# Patient Record
Sex: MALE | Race: OTHER | URBAN - METROPOLITAN AREA
[De-identification: names, ages, dates, MRNs, and addresses within clinical notes are randomized per-mention and may not be internally consistent; named-entity substitution may affect disease eponyms.]

---

## 2017-03-23 NOTE — PATIENT DISCUSSION
Cataract :Surgery Counseling: I have discussed the option of scheduling cataract surgery versus following, as well as the risks, benefits and alternatives of surgery with the patient. It was explained that the surgery is elective, there is no rush and there is no harm in waiting to have surgery. It was also explained that there is no guarantee that removing the cataract will improve their vision. The patient understands and desires to proceed with cataract surgery with implantation of an intraocular lens  to improve vision.

## 2017-03-23 NOTE — PATIENT DISCUSSION
CATARACTS, OU - VISUALLY SIGNIFICANT.  SCHEDULE OD FIRST THEN LATER IN OS IF VISUAL SYMPTOMS PERSIST.  ((TRYPAN BLUE))_)

## 2017-04-18 NOTE — PATIENT DISCUSSION
New Prescription: Pred Forte (prednisolone acetate): drops,suspension: 1% 1 drop four times a day as directed into affected eye 04-

## 2017-04-18 NOTE — PATIENT DISCUSSION
New Prescription: Ocuflox (ofloxacin): drops: 0.3% 1 drop four times a day as directed into affected eye 04-

## 2017-04-18 NOTE — PATIENT DISCUSSION
New Prescription: Acular (ketorolac): drops: 0.5% 1 drop four times a day as directed into affected eye 04-

## 2017-04-18 NOTE — PATIENT DISCUSSION
CATARACTS, OU - VISUALLY SIGNIFICANT. SCHEDULE OD FIRST THEN LATER IN OS IF VISUAL SYMPTOMS PERSIST.

## 2017-05-30 NOTE — PATIENT DISCUSSION
New Prescription: Acular LS (ketorolac): drops: 0.4% 1 drop four times a day as directed into affected eye 05-

## 2017-06-22 NOTE — PATIENT DISCUSSION
Continue: Ocuflox (ofloxacin): drops: 0.3% 1 drop four times a day as directed into affected eye 05-

## 2017-06-22 NOTE — PATIENT DISCUSSION
Continue: Pred Forte (prednisolone acetate): drops,suspension: 1% 1 drop four times a day as directed into affected eye 06-

## 2017-06-22 NOTE — PATIENT DISCUSSION
Continue: Acular LS (ketorolac): drops: 0.4% 1 drop four times a day as directed into affected eye 05-

## 2017-07-05 NOTE — PATIENT DISCUSSION
2 WEEK POST-OP- All looks good. Stop Acular and Ocuflox. Start to taper off of Pred-forte as directed. Rx was given for glasses and/or readers recommended.

## 2017-12-19 NOTE — PATIENT DISCUSSION
Post-laser (PRP) Counseling:  Treatment of the retina with laser greatly reduces but does not completely eliminate the risk of vitreous hemorrhage, retinal detachment, or vision loss. Keep any scheduled appointments for re-evaluation of the retina, and report any increase in flashing lights or floaters.

## 2018-03-13 NOTE — PATIENT DISCUSSION
NONPROLIFERATIVE DIABETIC RETINOPATHY, OS:  STABLE.  RETURN FOR FOLLOW-UP AS SCHEDULED FOR DILATED EYE EXAM.

## 2020-12-16 NOTE — PATIENT DISCUSSION
PROLIFERATIVE DIABETIC RETINOPATHY, OD:- ADVISED PT IMPORTANCE TO KEEP BS UNDER CONTROL, ANY CHANGES IN [place] CALL FOR AN APPT

## 2020-12-16 NOTE — PATIENT DISCUSSION
NONPROLIFERATIVE DIABETIC RETINOPATHY, OS:- CONTINUE TO FOLLOW, KEEP BS UNDER CONTROL, ANY CHANGES CALL OFFICE FOR AN APPT

## 2020-12-16 NOTE — PATIENT DISCUSSION
PROLIFERATIVE DIABETIC RETINOPATHY, OD:  I have talked with the patient about the nature of proliferative diabetic retinopathy and the significant risk of visual complications if not treated appropriately. For this reason regular follow-up with an ophthalmologist is essential.  I have also advised aggressive blood sugar and blood pressure control to minimize the risk of further damage.

## 2020-12-16 NOTE — PATIENT DISCUSSION
NONPROLIFERATIVE DIABETIC RETINOPATHY, OS:  I have talked with the patient about the nature of diabetic retinopathy and the potential for significant visual complications. For this reason regular follow-up with an ophthalmologist is essential.  I have also advised aggressive blood sugar and blood pressure control to minimize the risk of further damage.

## 2022-06-06 ENCOUNTER — CONSULTATION/EVALUATION (OUTPATIENT)
Dept: URBAN - METROPOLITAN AREA CLINIC 20 | Facility: CLINIC | Age: 32
End: 2022-06-06

## 2022-06-06 DIAGNOSIS — H52.03: ICD-10-CM

## 2022-06-06 PROCEDURE — 76514 ECHO EXAM OF EYE THICKNESS: CPT

## 2022-06-06 PROCEDURE — 92025 CPTRIZED CORNEAL TOPOGRAPHY: CPT

## 2022-06-06 PROCEDURE — 99499LK REFRACTIVE CONSULT/LASIK

## 2022-06-06 ASSESSMENT — KERATOMETRY
OD_AXISANGLE_DEGREES: 140
OS_K1POWER_DIOPTERS: 45.50
OD_K2POWER_DIOPTERS: 46.00
OD_AXISANGLE2_DEGREES: 050
OS_K2POWER_DIOPTERS: 46.25
OD_K1POWER_DIOPTERS: 45.50
OS_AXISANGLE2_DEGREES: 138
OS_AXISANGLE_DEGREES: 048

## 2022-06-06 ASSESSMENT — VISUAL ACUITY
OU_SC: 20/30
OU_SC: 20/30-2
OD_SC: 20/400
OD_SC: 20/200
OS_SC: 20/30-2
OS_SC: 20/30

## 2022-06-17 ENCOUNTER — CONSULTATION/EVALUATION (OUTPATIENT)
Dept: URBAN - METROPOLITAN AREA CLINIC 19 | Facility: CLINIC | Age: 32
End: 2022-06-17

## 2022-06-17 DIAGNOSIS — H17.9: ICD-10-CM

## 2022-06-17 PROCEDURE — 76514 ECHO EXAM OF EYE THICKNESS: CPT

## 2022-06-17 PROCEDURE — 99211NC NO CHARGE VISIT

## 2022-06-17 ASSESSMENT — KERATOMETRY
OD_AXISANGLE_DEGREES: 140
OS_AXISANGLE_DEGREES: 048
OD_K1POWER_DIOPTERS: 45.50
OD_AXISANGLE2_DEGREES: 050
OS_AXISANGLE2_DEGREES: 138
OD_K2POWER_DIOPTERS: 46.00
OS_K2POWER_DIOPTERS: 46.25
OS_K1POWER_DIOPTERS: 45.50